# Patient Record
(demographics unavailable — no encounter records)

---

## 2020-01-01 NOTE — ECHO
DATE OF STUDY:

2/2/20

 

DATE OF BIRTH:

1/31/20

 

REASON FOR STUDY:

Possible trisomy 21.

 

REQUESTING PHYSICIAN:

Dr. Goel 

 

Weight: 2.66 kilograms

Height: 48 cm

 

TWO DIMENSIONAL FINDINGS:

A complete transthoracic echocardiogram is performed and provided on digital clip images. The images 
were technically adequate for interpretation. There is levocardia with visceral and atrial situs leanna
tus. There were no obvious abnormalities of the systemic or pulmonary venous return. There was atrial
 ventricular concordance and ventricular arterial concordance. There was grossly normal morphology of
 the atrial and ventricular valves and semilunar valves. There was a patent foramen ovale present. Th
ere was normal biventricular systolic function. The right ventricle was mildly dilated and trabeculat
ed. There was flattened interventricular septal motion consistent with elevated pulmonary artery/righ
t ventricular pressure. There was no significant pericardial effusion. 

 

DOPPLER FINDINGS:

Color, pulsed wave, and continuous wave Doppler of all cardiac structures was reviewed. There were no
 obvious abnormalities of systemic or pulmonary venous return. There was unobstructed mitral and tric
uspid valve inflow. There was trivial tricuspid valve regurgitation. There was a stretched patent for
amen ovale with left to right shunting. There was no obvious ventricular level shunting detected. The
re was no obvious right or left ventricular outflow tract obstruction. There was a small patent ductu
s arteriosus with bidirectional, predominantly left to right shunting. The aortic arch is unobstructe
d. 

 

IMPRESSION:  

1.      Small patent ductus arteriosus with bidirectional, predominantly left to right shunting. 

2.      Stretched patent foramen ovale with left to right shunting. 

3.      Normal valvular structure and function. 

4.      Mildly dilated and trabeculated right ventricle with good systolic function. 

5.      Normal left ventricular size and systolic function with flattened septal motion. 

6.      Unobstructed aortic arch. 

7.      No significant pericardial effusion. 

8.      These findings were discussed with Dr. Goel at 4 p.m.

9.      Recommend routine outpatient cardiology follow-up with repeat echocardiogram in the next one 
to two months.

## 2020-01-01 NOTE — DIS
DATE OF ADMISSION:  2020



DATE OF DISCHARGE:  2020



RESIDENT:  Virginie Garcia DO



ATTENDING:  Nils Bellamy MD



DISCHARGE DIAGNOSES:  

1. Apparent off for gestational age viable female.

2. Abnormal facies, possible Down syndrome.

3. Maternal history of pertinent positive as a teen pregnancy.  Maternal 
history of

GBS positive treated x4 doses of penicillin, last dose at 2121 on 2020. 



PROCEDURES PERFORMED:  Echocardiogram which showed a PDA with minimal left-to-
right

shunting and a small PFO.  This will require follow up with [facility] in one

month.  [facility] hearing screen which still is requiring outpatient followup with

audiology. 



HISTORY OF PRESENT ILLNESS:  Baby girl represented the 39-week product 
delivered to

a 15-year-old, G1, P0, now P1, blood type O-positive, antibody negative, GC/C 
negative, GBS

positive x4, antibiotic prophylaxis treatments adequately treated, Hep B

nonreactive, HIV nonreactive, RPR nonreactive, rubella immune.  Maternal 
history is

positive for teen pregnancy.  The pregnancy course was uncomplicated.  Primary

low-transverse  delivery was accomplished at 2210 on 2020 by Dr. Abrahan Knight with Dr. Mora, attending.  No resuscitation was needed.  Apgars

were 8 and 9 at 1 and 5 minutes respectively.  The patient was taken for primary

 due to nonreassuring fetal heart tones. 



PHYSICAL EXAMINATION:

Weight 3240 g or 5 pounds 13 ounces.  Length 19 in and head circumference 14 
in.  Physical exam was remarkable for ecchymosis on forehead and abnormal

facies including slanted palpebral fissures, flat nasal bridge, small chin

consistent with that of possible Down's syndrome. 



HOSPITAL COURSE:  The infant experienced an unremarkable hospital course,

established feedings well, voided and stooled normally.  Due to the patient's

physical exam findings, cytogenetic screening was sent off for Down syndrome

analysis and if it is still pending, we will follow up with parents on 
chromosome

analysis.  The patient failed car seat test on 2020, but passed on 2020. Failed hearing screen, referred to autiology outpt. 



DISCHARGE INSTRUCTIONS:  

1. Discharged to home on 2020 with a discharge weight of 2660 g or 5 
pounds 14

ounces. 

2. No medications.

3. Diet, breast and bottle.

4. Blood type O positive, Chetna negative.

5. Hearing screen passed initially on the right side on 2020, but failed 
on

the left side on 2020 with referral sent to Audiology outpatient.  

6. Hep B vaccine given on 2020.  

7 .Discharge bilirubin was 7.9 on 2020, placing the

patient on low risk. 

8. Followup:  Follow up with [facility] in 2 days for PCP appointment.  

Follow up with Cardiology in one month.  

Follow up with Ophthalmology as recommended due to

possible Down syndrome.  

Follow up with Audiology as failed [facility] hearing screen. 







Job ID:  [id number]



[facility]